# Patient Record
Sex: MALE | Race: OTHER | URBAN - METROPOLITAN AREA
[De-identification: names, ages, dates, MRNs, and addresses within clinical notes are randomized per-mention and may not be internally consistent; named-entity substitution may affect disease eponyms.]

---

## 2021-03-13 DIAGNOSIS — Z23 NEED FOR VACCINATION: ICD-10-CM

## 2021-12-28 ENCOUNTER — TELEPHONE (OUTPATIENT)
Dept: INTERNAL MEDICINE CLINIC | Facility: CLINIC | Age: 81
End: 2021-12-28

## 2023-07-24 RX ORDER — DIPHENOXYLATE HYDROCHLORIDE AND ATROPINE SULFATE 2.5; .025 MG/1; MG/1
1 TABLET ORAL
COMMUNITY

## 2023-07-24 RX ORDER — ACYCLOVIR 400 MG/1
400 TABLET ORAL DAILY
COMMUNITY

## 2023-07-24 RX ORDER — ATORVASTATIN CALCIUM 10 MG/1
10 TABLET, FILM COATED ORAL NIGHTLY
COMMUNITY

## 2023-07-24 RX ORDER — LEVOTHYROXINE SODIUM 0.03 MG/1
25 TABLET ORAL
COMMUNITY

## 2023-07-24 RX ORDER — ERGOCALCIFEROL 1.25 MG/1
50000 CAPSULE ORAL WEEKLY
COMMUNITY

## 2023-07-24 RX ORDER — MELATONIN
325 DAILY
COMMUNITY

## 2023-07-31 ENCOUNTER — HOSPITAL ENCOUNTER (OUTPATIENT)
Facility: HOSPITAL | Age: 83
Discharge: HOME | DRG: 726 | End: 2023-08-02
Attending: UROLOGY | Admitting: UROLOGY
Payer: MEDICARE

## 2023-07-31 ENCOUNTER — ANESTHESIA (OUTPATIENT)
Dept: SURGERY | Facility: HOSPITAL | Age: 83
DRG: 726 | End: 2023-07-31
Payer: MEDICARE

## 2023-07-31 ENCOUNTER — HOSPITAL ENCOUNTER (INPATIENT)
Facility: HOSPITAL | Age: 83
LOS: 1 days | Discharge: HOME | DRG: 726 | End: 2023-08-02
Attending: UROLOGY | Admitting: UROLOGY
Payer: MEDICARE

## 2023-07-31 ENCOUNTER — ANESTHESIA EVENT (OUTPATIENT)
Dept: SURGERY | Facility: HOSPITAL | Age: 83
DRG: 726 | End: 2023-07-31
Payer: MEDICARE

## 2023-07-31 PROBLEM — N40.1 BPH LOC W URIN OBS/LUTS: Status: ACTIVE | Noted: 2023-07-31

## 2023-07-31 PROBLEM — E03.9 HYPOTHYROIDISM: Status: ACTIVE | Noted: 2023-07-31

## 2023-07-31 PROCEDURE — 99212 OFFICE O/P EST SF 10 MIN: CPT | Performed by: HOSPITALIST

## 2023-07-31 PROCEDURE — 0V508ZZ DESTRUCTION OF PROSTATE, VIA NATURAL OR ARTIFICIAL OPENING ENDOSCOPIC: ICD-10-PCS | Performed by: UROLOGY

## 2023-07-31 RX ORDER — VECURONIUM BROMIDE 1 MG/ML
INJECTION, POWDER, LYOPHILIZED, FOR SOLUTION INTRAVENOUS AS NEEDED
Status: DISCONTINUED | OUTPATIENT
Start: 2023-07-31 | End: 2023-07-31 | Stop reason: SURG

## 2023-07-31 RX ORDER — CEFAZOLIN SODIUM/WATER 2 G/20 ML
2 SYRINGE (ML) INTRAVENOUS EVERY 8 HOURS
Status: COMPLETED | OUTPATIENT
Start: 2023-07-31 | End: 2023-08-01

## 2023-07-31 RX ORDER — ONDANSETRON 4 MG/1
4 TABLET, ORALLY DISINTEGRATING ORAL EVERY 6 HOURS PRN
Status: DISCONTINUED | OUTPATIENT
Start: 2023-07-31 | End: 2023-08-02

## 2023-07-31 RX ORDER — FINASTERIDE 5 MG/1
5 TABLET, FILM COATED ORAL DAILY
Status: DISCONTINUED | OUTPATIENT
Start: 2023-08-01 | End: 2023-08-02

## 2023-07-31 RX ORDER — HYDROMORPHONE HYDROCHLORIDE 1 MG/ML
0.2 INJECTION, SOLUTION INTRAMUSCULAR; INTRAVENOUS; SUBCUTANEOUS EVERY 5 MIN PRN
Status: DISCONTINUED | OUTPATIENT
Start: 2023-07-31 | End: 2023-07-31 | Stop reason: HOSPADM

## 2023-07-31 RX ORDER — SENNOSIDES 8.6 MG
17.2 TABLET ORAL NIGHTLY PRN
Status: DISCONTINUED | OUTPATIENT
Start: 2023-07-31 | End: 2023-08-02

## 2023-07-31 RX ORDER — HYDROMORPHONE HYDROCHLORIDE 1 MG/ML
0.4 INJECTION, SOLUTION INTRAMUSCULAR; INTRAVENOUS; SUBCUTANEOUS EVERY 5 MIN PRN
Status: DISCONTINUED | OUTPATIENT
Start: 2023-07-31 | End: 2023-07-31 | Stop reason: HOSPADM

## 2023-07-31 RX ORDER — BISACODYL 10 MG
10 SUPPOSITORY, RECTAL RECTAL
Status: DISCONTINUED | OUTPATIENT
Start: 2023-07-31 | End: 2023-08-02

## 2023-07-31 RX ORDER — ENEMA 19; 7 G/133ML; G/133ML
1 ENEMA RECTAL ONCE AS NEEDED
Status: DISCONTINUED | OUTPATIENT
Start: 2023-07-31 | End: 2023-08-02

## 2023-07-31 RX ORDER — HYDROMORPHONE HYDROCHLORIDE 1 MG/ML
0.4 INJECTION, SOLUTION INTRAMUSCULAR; INTRAVENOUS; SUBCUTANEOUS EVERY 2 HOUR PRN
Status: DISCONTINUED | OUTPATIENT
Start: 2023-07-31 | End: 2023-08-02

## 2023-07-31 RX ORDER — MORPHINE SULFATE 4 MG/ML
4 INJECTION, SOLUTION INTRAMUSCULAR; INTRAVENOUS EVERY 10 MIN PRN
Status: DISCONTINUED | OUTPATIENT
Start: 2023-07-31 | End: 2023-07-31 | Stop reason: HOSPADM

## 2023-07-31 RX ORDER — MAGNESIUM HYDROXIDE 1200 MG/15ML
3000 LIQUID ORAL CONTINUOUS
Status: DISCONTINUED | OUTPATIENT
Start: 2023-07-31 | End: 2023-08-02

## 2023-07-31 RX ORDER — ACETAMINOPHEN 325 MG/1
650 TABLET ORAL
Status: DISCONTINUED | OUTPATIENT
Start: 2023-07-31 | End: 2023-08-02

## 2023-07-31 RX ORDER — NALOXONE HYDROCHLORIDE 0.4 MG/ML
80 INJECTION, SOLUTION INTRAMUSCULAR; INTRAVENOUS; SUBCUTANEOUS AS NEEDED
Status: DISCONTINUED | OUTPATIENT
Start: 2023-07-31 | End: 2023-07-31 | Stop reason: HOSPADM

## 2023-07-31 RX ORDER — SODIUM CHLORIDE, SODIUM LACTATE, POTASSIUM CHLORIDE, CALCIUM CHLORIDE 600; 310; 30; 20 MG/100ML; MG/100ML; MG/100ML; MG/100ML
INJECTION, SOLUTION INTRAVENOUS CONTINUOUS
Status: DISCONTINUED | OUTPATIENT
Start: 2023-07-31 | End: 2023-07-31 | Stop reason: HOSPADM

## 2023-07-31 RX ORDER — OXYCODONE HYDROCHLORIDE 5 MG/1
5 TABLET ORAL EVERY 4 HOURS PRN
Status: DISCONTINUED | OUTPATIENT
Start: 2023-07-31 | End: 2023-08-02

## 2023-07-31 RX ORDER — OXYCODONE HYDROCHLORIDE 5 MG/1
2.5 TABLET ORAL EVERY 4 HOURS PRN
Status: DISCONTINUED | OUTPATIENT
Start: 2023-07-31 | End: 2023-08-02

## 2023-07-31 RX ORDER — HYDROMORPHONE HYDROCHLORIDE 1 MG/ML
0.2 INJECTION, SOLUTION INTRAMUSCULAR; INTRAVENOUS; SUBCUTANEOUS EVERY 2 HOUR PRN
Status: DISCONTINUED | OUTPATIENT
Start: 2023-07-31 | End: 2023-08-02

## 2023-07-31 RX ORDER — ACETAMINOPHEN 500 MG
1000 TABLET ORAL ONCE
Status: COMPLETED | OUTPATIENT
Start: 2023-07-31 | End: 2023-07-31

## 2023-07-31 RX ORDER — LEVOTHYROXINE SODIUM 0.03 MG/1
25 TABLET ORAL
Status: DISCONTINUED | OUTPATIENT
Start: 2023-08-01 | End: 2023-08-02

## 2023-07-31 RX ORDER — MORPHINE SULFATE 10 MG/ML
6 INJECTION, SOLUTION INTRAMUSCULAR; INTRAVENOUS EVERY 10 MIN PRN
Status: DISCONTINUED | OUTPATIENT
Start: 2023-07-31 | End: 2023-07-31 | Stop reason: HOSPADM

## 2023-07-31 RX ORDER — SODIUM CHLORIDE, SODIUM LACTATE, POTASSIUM CHLORIDE, CALCIUM CHLORIDE 600; 310; 30; 20 MG/100ML; MG/100ML; MG/100ML; MG/100ML
INJECTION, SOLUTION INTRAVENOUS CONTINUOUS
Status: DISCONTINUED | OUTPATIENT
Start: 2023-07-31 | End: 2023-07-31

## 2023-07-31 RX ORDER — ATORVASTATIN CALCIUM 10 MG/1
10 TABLET, FILM COATED ORAL NIGHTLY
Status: DISCONTINUED | OUTPATIENT
Start: 2023-07-31 | End: 2023-08-02

## 2023-07-31 RX ORDER — HYDROMORPHONE HYDROCHLORIDE 1 MG/ML
0.6 INJECTION, SOLUTION INTRAMUSCULAR; INTRAVENOUS; SUBCUTANEOUS EVERY 5 MIN PRN
Status: DISCONTINUED | OUTPATIENT
Start: 2023-07-31 | End: 2023-07-31 | Stop reason: HOSPADM

## 2023-07-31 RX ORDER — POLYETHYLENE GLYCOL 3350 17 G/17G
17 POWDER, FOR SOLUTION ORAL DAILY PRN
Status: DISCONTINUED | OUTPATIENT
Start: 2023-07-31 | End: 2023-08-02

## 2023-07-31 RX ORDER — CEFAZOLIN SODIUM/WATER 2 G/20 ML
2 SYRINGE (ML) INTRAVENOUS ONCE
Status: COMPLETED | OUTPATIENT
Start: 2023-07-31 | End: 2023-07-31

## 2023-07-31 RX ORDER — TAMSULOSIN HYDROCHLORIDE 0.4 MG/1
0.4 CAPSULE ORAL
Status: DISCONTINUED | OUTPATIENT
Start: 2023-08-01 | End: 2023-08-02

## 2023-07-31 RX ORDER — LIDOCAINE HYDROCHLORIDE 10 MG/ML
INJECTION, SOLUTION EPIDURAL; INFILTRATION; INTRACAUDAL; PERINEURAL AS NEEDED
Status: DISCONTINUED | OUTPATIENT
Start: 2023-07-31 | End: 2023-07-31 | Stop reason: SURG

## 2023-07-31 RX ORDER — MORPHINE SULFATE 4 MG/ML
2 INJECTION, SOLUTION INTRAMUSCULAR; INTRAVENOUS EVERY 10 MIN PRN
Status: DISCONTINUED | OUTPATIENT
Start: 2023-07-31 | End: 2023-07-31 | Stop reason: HOSPADM

## 2023-07-31 RX ORDER — PANTOPRAZOLE SODIUM 40 MG/1
40 TABLET, DELAYED RELEASE ORAL
Status: DISCONTINUED | OUTPATIENT
Start: 2023-08-01 | End: 2023-08-02

## 2023-07-31 RX ORDER — ONDANSETRON 2 MG/ML
4 INJECTION INTRAMUSCULAR; INTRAVENOUS EVERY 6 HOURS PRN
Status: DISCONTINUED | OUTPATIENT
Start: 2023-07-31 | End: 2023-08-02

## 2023-07-31 RX ORDER — SODIUM CHLORIDE 9 MG/ML
INJECTION, SOLUTION INTRAVENOUS CONTINUOUS
Status: DISCONTINUED | OUTPATIENT
Start: 2023-07-31 | End: 2023-08-02

## 2023-07-31 RX ADMIN — VECURONIUM BROMIDE 1 MG: 1 INJECTION, POWDER, LYOPHILIZED, FOR SOLUTION INTRAVENOUS at 10:05:00

## 2023-07-31 RX ADMIN — VECURONIUM BROMIDE 4 MG: 1 INJECTION, POWDER, LYOPHILIZED, FOR SOLUTION INTRAVENOUS at 09:55:00

## 2023-07-31 RX ADMIN — VECURONIUM BROMIDE 1 MG: 1 INJECTION, POWDER, LYOPHILIZED, FOR SOLUTION INTRAVENOUS at 09:50:00

## 2023-07-31 RX ADMIN — CEFAZOLIN SODIUM/WATER 2 G: 2 G/20 ML SYRINGE (ML) INTRAVENOUS at 09:35:00

## 2023-07-31 RX ADMIN — LIDOCAINE HYDROCHLORIDE 50 MG: 10 INJECTION, SOLUTION EPIDURAL; INFILTRATION; INTRACAUDAL; PERINEURAL at 09:48:00

## 2023-07-31 RX ADMIN — VECURONIUM BROMIDE 1 MG: 1 INJECTION, POWDER, LYOPHILIZED, FOR SOLUTION INTRAVENOUS at 10:55:00

## 2023-07-31 NOTE — BRIEF OP NOTE
Pre-Operative Diagnosis: Enlarged prostate, urinary retention     Post-Operative Diagnosis: Enlarged prostate      Procedure Performed:   Cystoscopy, bipolar transurethral resection of prostate    Surgeon(s) and Role:     Jennifer Smith MD - Primary    Assistant(s):        Surgical Findings: Trilobar obstruction, enlarged ball valving median lobe. Bilateral lateral wall diverticuli. Right ureteral orifice seen within right tic, left UO not seen.      Specimen: Prostate chips     Estimated Blood Loss: 25 mL      Jaymie Salas MD  7/31/2023  11:57 AM

## 2023-07-31 NOTE — ANESTHESIA PROCEDURE NOTES
Airway  Date/Time: 7/31/2023 9:57 AM  Urgency: elective    Airway not difficult    General Information and Staff    Patient location during procedure: OR  Anesthesiologist: Migel Keenan MD  Performed: anesthesiologist   Performed by: Migel Keenan MD  Authorized by: Migel Keenan MD      Indications and Patient Condition  Indications for airway management: anesthesia  Spontaneous Ventilation: absent  Sedation level: deep  Preoxygenated: yes  Patient position: sniffing  Mask difficulty assessment: 2 - vent by mask + OA or adjuvant +/- NMBA    Final Airway Details  Final airway type: endotracheal airway      Successful airway: ETT     Successful intubation technique: Video laryngoscopy  Facilitating devices/methods: intubating stylet  Endotracheal tube insertion site: oral  Blade size: #4  ETT size (mm): 7.5    Cormack-Lehane Classification: grade I - full view of glottis  Placement verified by: capnometry and single lung ventilation   Measured from: teeth  ETT to teeth (cm): 21  Number of attempts at approach: 1  Ventilation between attempts: none  Number of other approaches attempted: 0    Additional Comments  [place]

## 2023-07-31 NOTE — PLAN OF CARE
Problem: Patient Centered Care  Goal: Patient preferences are identified and integrated in the patient's plan of care  Description: Interventions:  - What would you like us to know as we care for you?   - Provide timely, complete, and accurate information to patient/family  - Incorporate patient and family knowledge, values, beliefs, and cultural backgrounds into the planning and delivery of care  - Encourage patient/family to participate in care and decision-making at the level they choose  - Honor patient and family perspectives and choices  Outcome: Progressing     Problem: Patient/Family Goals  Goal: Patient/Family Long Term Goal  Description: Patient's Long Term Goal:     Interventions:  -   - See additional Care Plan goals for specific interventions  Outcome: Progressing  Goal: Patient/Family Short Term Goal  Description: Patient's Short Term Goal:     Interventions:   -  - See additional Care Plan goals for specific interventions  Outcome: Progressing     Problem: PAIN - ADULT  Goal: Verbalizes/displays adequate comfort level or patient's stated pain goal  Description: INTERVENTIONS:  - Encourage pt to monitor pain and request assistance  - Assess pain using appropriate pain scale  - Administer analgesics based on type and severity of pain and evaluate response  - Implement non-pharmacological measures as appropriate and evaluate response  - Consider cultural and social influences on pain and pain management  - Manage/alleviate anxiety  - Utilize distraction and/or relaxation techniques  - Monitor for opioid side effects  - Notify MD/LIP if interventions unsuccessful or patient reports new pain  - Anticipate increased pain with activity and pre-medicate as appropriate  Outcome: Progressing     Problem: RISK FOR INFECTION - ADULT  Goal: Absence of fever/infection during anticipated neutropenic period  Description: INTERVENTIONS  - Monitor WBC  - Administer growth factors as ordered  - Implement neutropenic guidelines  Outcome: Progressing     Problem: SAFETY ADULT - FALL  Goal: Free from fall injury  Description: INTERVENTIONS:  - Assess pt frequently for physical needs  - Identify cognitive and physical deficits and behaviors that affect risk of falls. - [place] fall precautions as indicated by assessment.  - Educate pt/family on patient safety including physical limitations  - Instruct pt to call for assistance with activity based on assessment  - Modify environment to reduce risk of injury  - Provide assistive devices as appropriate  - Consider OT/PT consult to assist with strengthening/mobility  - Encourage toileting schedule  Outcome: Progressing     Primarily Yoruba-speaking, family available at bedside to translate. Alert and oriented x4. Reports mild surgical pain. Pain managed with scheduled tylenol. CBI running @ moderate rate, + pink/clear output. Tolerating well. Up standby assist with rolling walker. IV fluids.  Plans to discharge home pending medical clearance

## 2023-08-01 PROBLEM — Z90.79 S/P PROSTATECTOMY: Status: ACTIVE | Noted: 2023-08-01

## 2023-08-01 LAB
ANION GAP SERPL CALC-SCNC: 6 MMOL/L (ref 0–18)
BUN BLD-MCNC: 16 MG/DL (ref 7–18)
BUN/CREAT SERPL: 15.4 (ref 10–20)
CALCIUM BLD-MCNC: 8.1 MG/DL (ref 8.5–10.1)
CHLORIDE SERPL-SCNC: 110 MMOL/L (ref 98–112)
CO2 SERPL-SCNC: 25 MMOL/L (ref 21–32)
CREAT BLD-MCNC: 1.04 MG/DL
DEPRECATED RDW RBC AUTO: 43.5 FL (ref 35.1–46.3)
EGFRCR SERPLBLD CKD-EPI 2021: 72 ML/MIN/1.73M2 (ref 60–?)
ERYTHROCYTE [DISTWIDTH] IN BLOOD BY AUTOMATED COUNT: 13.1 % (ref 11–15)
GLUCOSE BLD-MCNC: 91 MG/DL (ref 70–99)
HCT VFR BLD AUTO: 35.8 %
HGB BLD-MCNC: 12.2 G/DL
MCH RBC QN AUTO: 31.2 PG (ref 26–34)
MCHC RBC AUTO-ENTMCNC: 34.1 G/DL (ref 31–37)
MCV RBC AUTO: 91.6 FL
OSMOLALITY SERPL CALC.SUM OF ELEC: 293 MOSM/KG (ref 275–295)
PLATELET # BLD AUTO: 146 10(3)UL (ref 150–450)
POTASSIUM SERPL-SCNC: 4.2 MMOL/L (ref 3.5–5.1)
RBC # BLD AUTO: 3.91 X10(6)UL
SODIUM SERPL-SCNC: 141 MMOL/L (ref 136–145)
WBC # BLD AUTO: 9.5 X10(3) UL (ref 4–11)

## 2023-08-01 PROCEDURE — 99233 SBSQ HOSP IP/OBS HIGH 50: CPT | Performed by: HOSPITALIST

## 2023-08-01 NOTE — PLAN OF CARE
Pt alert and oriented primarily speaks Irish and is on RA. Pt's pain is controled with Tylenol. Pt denies nausea or vomiting. CBI is in place with pink tinged output on Moderate flow. IVF and IV abx are running as ordered. Pt is up with standby assist and walker. Pt is tolerating general diet well. Call light is within reach and safety measures are in place. Problem: PAIN - ADULT  Goal: Verbalizes/displays adequate comfort level or patient's stated pain goal  Description: INTERVENTIONS:  - Encourage pt to monitor pain and request assistance  - Assess pain using appropriate pain scale  - Administer analgesics based on type and severity of pain and evaluate response  - Implement non-pharmacological measures as appropriate and evaluate response  - Consider cultural and social influences on pain and pain management  - Manage/alleviate anxiety  - Utilize distraction and/or relaxation techniques  - Monitor for opioid side effects  - Notify MD/LIP if interventions unsuccessful or patient reports new pain  - Anticipate increased pain with activity and pre-medicate as appropriate  Outcome: Progressing     Problem: RISK FOR INFECTION - ADULT  Goal: Absence of fever/infection during anticipated neutropenic period  Description: INTERVENTIONS  - Monitor WBC  - Administer growth factors as ordered  - Implement neutropenic guidelines  Outcome: Progressing     Problem: SAFETY ADULT - FALL  Goal: Free from fall injury  Description: INTERVENTIONS:  - Assess pt frequently for physical needs  - Identify cognitive and physical deficits and behaviors that affect risk of falls.   - [place] fall precautions as indicated by assessment.  - Educate pt/family on patient safety including physical limitations  - Instruct pt to call for assistance with activity based on assessment  - Modify environment to reduce risk of injury  - Provide assistive devices as appropriate  - Consider OT/PT consult to assist with strengthening/mobility  - Encourage toileting schedule  Outcome: Progressing

## 2023-08-02 VITALS
HEART RATE: 89 BPM | DIASTOLIC BLOOD PRESSURE: 61 MMHG | HEIGHT: 69 IN | TEMPERATURE: 98 F | SYSTOLIC BLOOD PRESSURE: 111 MMHG | RESPIRATION RATE: 19 BRPM | WEIGHT: 170.38 LBS | BODY MASS INDEX: 25.23 KG/M2 | OXYGEN SATURATION: 98 %

## 2023-08-02 PROCEDURE — 99239 HOSP IP/OBS DSCHRG MGMT >30: CPT | Performed by: HOSPITALIST

## 2023-08-02 RX ORDER — DUTASTERIDE 0.5 MG/1
0.5 CAPSULE, LIQUID FILLED ORAL DAILY
Qty: 30 CAPSULE | Refills: 0 | Status: SHIPPED | OUTPATIENT
Start: 2023-08-02 | End: 2023-08-02

## 2023-08-02 RX ORDER — OXYCODONE HYDROCHLORIDE 5 MG/1
2.5 TABLET ORAL EVERY 6 HOURS PRN
Qty: 6 TABLET | Refills: 0 | Status: SHIPPED | OUTPATIENT
Start: 2023-08-02

## 2023-08-02 RX ORDER — ACETAMINOPHEN 325 MG/1
650 TABLET ORAL EVERY 6 HOURS PRN
Qty: 1 TABLET | Refills: 0 | Status: SHIPPED | COMMUNITY
Start: 2023-08-02

## 2023-08-02 RX ORDER — FINASTERIDE 5 MG/1
5 TABLET, FILM COATED ORAL DAILY
Qty: 1 TABLET | Refills: 0 | Status: SHIPPED | COMMUNITY
Start: 2023-08-03

## 2023-08-02 RX ORDER — NALOXONE HYDROCHLORIDE 4 MG/.1ML
4 SPRAY NASAL AS NEEDED
Qty: 1 KIT | Refills: 0 | Status: SHIPPED | OUTPATIENT
Start: 2023-08-02

## 2023-08-02 NOTE — PLAN OF CARE
Problem: Patient Centered Care  Goal: Patient preferences are identified and integrated in the patient's plan of care  Description: Interventions:  - What would you like us to know as we care for you?   - Provide timely, complete, and accurate information to patient/family  - Incorporate patient and family knowledge, values, beliefs, and cultural backgrounds into the planning and delivery of care  - Encourage patient/family to participate in care and decision-making at the level they choose  - Honor patient and family perspectives and choices  Outcome: Progressing     Problem: Patient/Family Goals  Goal: Patient/Family Long Term Goal  Description: Patient's Long Term Goal:     Interventions:  -   - See additional Care Plan goals for specific interventions  Outcome: Progressing  Goal: Patient/Family Short Term Goal  Description: Patient's Short Term Goal:     Interventions:   -   - See additional Care Plan goals for specific interventions  Outcome: Progressing     Problem: PAIN - ADULT  Goal: Verbalizes/displays adequate comfort level or patient's stated pain goal  Description: INTERVENTIONS:  - Encourage pt to monitor pain and request assistance  - Assess pain using appropriate pain scale  - Administer analgesics based on type and severity of pain and evaluate response  - Implement non-pharmacological measures as appropriate and evaluate response  - Consider cultural and social influences on pain and pain management  - Manage/alleviate anxiety  - Utilize distraction and/or relaxation techniques  - Monitor for opioid side effects  - Notify MD/LIP if interventions unsuccessful or patient reports new pain  - Anticipate increased pain with activity and pre-medicate as appropriate  Outcome: Progressing     Problem: RISK FOR INFECTION - ADULT  Goal: Absence of fever/infection during anticipated neutropenic period  Description: INTERVENTIONS  - Monitor WBC  - Administer growth factors as ordered  - Implement neutropenic guidelines  Outcome: Progressing     Problem: SAFETY ADULT - FALL  Goal: Free from fall injury  Description: INTERVENTIONS:  - Assess pt frequently for physical needs  - Identify cognitive and physical deficits and behaviors that affect risk of falls. - [place] fall precautions as indicated by assessment.  - Educate pt/family on patient safety including physical limitations  - Instruct pt to call for assistance with activity based on assessment  - Modify environment to reduce risk of injury  - Provide assistive devices as appropriate  - Consider OT/PT consult to assist with strengthening/mobility  - Encourage toileting schedule  Outcome: Progressing   A/Ox4. Vital signs stable on room air. Tolerating diet. CBI running at a moderate/slow rate, clamped at 6am. Scheduled tylenol for pain management. Up with stand by assist. Fall precautions in place, call light within reach, frequent rounding done.

## 2023-08-02 NOTE — PLAN OF CARE
Pt denies pain/discomfort. Tolerating diet. Darling removed by MD, voiding trial. Pt able to void, PVRs recorded and MD notified. Pt cleared to discharge home today. Reviewed discharge informations, medications, and follow up with patient and wife via [employer] . VSS. DC'ed home in stable condition.        Problem: Patient Centered Care  Goal: Patient preferences are identified and integrated in the patient's plan of care  Description: Interventions:  - What would you like us to know as we care for you?   - Provide timely, complete, and accurate information to patient/family  - Incorporate patient and family knowledge, values, beliefs, and cultural backgrounds into the planning and delivery of care  - Encourage patient/family to participate in care and decision-making at the level they choose  - Honor patient and family perspectives and choices  Outcome: Adequate for Discharge       Problem: PAIN - ADULT  Goal: Verbalizes/displays adequate comfort level or patient's stated pain goal  Description: INTERVENTIONS:  - Encourage pt to monitor pain and request assistance  - Assess pain using appropriate pain scale  - Administer analgesics based on type and severity of pain and evaluate response  - Implement non-pharmacological measures as appropriate and evaluate response  - Consider cultural and social influences on pain and pain management  - Manage/alleviate anxiety  - Utilize distraction and/or relaxation techniques  - Monitor for opioid side effects  - Notify MD/LIP if interventions unsuccessful or patient reports new pain  - Anticipate increased pain with activity and pre-medicate as appropriate  Outcome: Adequate for Discharge     Problem: RISK FOR INFECTION - ADULT  Goal: Absence of fever/infection during anticipated neutropenic period  Description: INTERVENTIONS  - Monitor WBC  - Administer growth factors as ordered  - Implement neutropenic guidelines  Outcome: Adequate for Discharge     Problem: SAFETY ADULT - FALL  Goal: Free from fall injury  Description: INTERVENTIONS:  - Assess pt frequently for physical needs  - Identify cognitive and physical deficits and behaviors that affect risk of falls.   - [place] fall precautions as indicated by assessment.  - Educate pt/family on patient safety including physical limitations  - Instruct pt to call for assistance with activity based on assessment  - Modify environment to reduce risk of injury  - Provide assistive devices as appropriate  - Consider OT/PT consult to assist with strengthening/mobility  - Encourage toileting schedule  Outcome: Adequate for Discharge

## 2023-08-02 NOTE — PLAN OF CARE
Appetite good, scheduled tylenol for pain control, CBI pink/clear no clots noted, plan to clamp at 6am, up ambulating in hallway,   Patient and wife updated on care plan.     Problem: Patient Centered Care  Goal: Patient preferences are identified and integrated in the patient's plan of care  Description: Interventions:  - What would you like us to know as we care for you? -  - Provide timely, complete, and accurate information to patient/family  - Incorporate patient and family knowledge, values, beliefs, and cultural backgrounds into the planning and delivery of care  - Encourage patient/family to participate in care and decision-making at the level they choose  - Honor patient and family perspectives and choices  Outcome: Progressing     Problem: Patient/Family Goals  Goal: Patient/Family Long Term Goal  Description: Patient's Long Term Goal: -    Interventions:  - -  - See additional Care Plan goals for specific interventions  Outcome: Progressing  Goal: Patient/Family Short Term Goal  Description: Patient's Short Term Goal: -    Interventions:   - -  - See additional Care Plan goals for specific interventions  Outcome: Progressing     Problem: PAIN - ADULT  Goal: Verbalizes/displays adequate comfort level or patient's stated pain goal  Description: INTERVENTIONS:  - Encourage pt to monitor pain and request assistance  - Assess pain using appropriate pain scale  - Administer analgesics based on type and severity of pain and evaluate response  - Implement non-pharmacological measures as appropriate and evaluate response  - Consider cultural and social influences on pain and pain management  - Manage/alleviate anxiety  - Utilize distraction and/or relaxation techniques  - Monitor for opioid side effects  - Notify MD/LIP if interventions unsuccessful or patient reports new pain  - Anticipate increased pain with activity and pre-medicate as appropriate  Outcome: Progressing     Problem: RISK FOR INFECTION - ADULT  Goal: Absence of fever/infection during anticipated neutropenic period  Description: INTERVENTIONS  - Monitor WBC  - Administer growth factors as ordered  - Implement neutropenic guidelines  Outcome: Progressing     Problem: SAFETY ADULT - FALL  Goal: Free from fall injury  Description: INTERVENTIONS:  - Assess pt frequently for physical needs  - Identify cognitive and physical deficits and behaviors that affect risk of falls.   - [place] fall precautions as indicated by assessment.  - Educate pt/family on patient safety including physical limitations  - Instruct pt to call for assistance with activity based on assessment  - Modify environment to reduce risk of injury  - Provide assistive devices as appropriate  - Consider OT/PT consult to assist with strengthening/mobility  - Encourage toileting schedule  Outcome: Progressing

## 2023-08-02 NOTE — DISCHARGE SUMMARY
Dc summary#[id number]  > 30 min spent on [facility] Discharge Diagnoses: s/p turp hematuria    Lace+ Score: 31  59-90 High Risk  29-58 Medium Risk  0-28   Low Risk. TCM Follow-Up Recommendation:  LACE < 29: Low Risk of readmission after discharge from the hospital; Still recommend for TCM follow-up.

## 2023-08-03 NOTE — DISCHARGE SUMMARY
Milo Chaparro    PATIENT'S NAME: Missy Robbins   ATTENDING PHYSICIAN: Vickie Escobar MD   PATIENT ACCOUNT#:   [id number]    LOCATION:  [address] [place] [address] [address] RECORD #:   [id number]       YOB: 1940  ADMISSION DATE:       07/31/2023      DISCHARGE DATE:  08/02/2023    DISCHARGE SUMMARY  45 min spent on [address] [employer] COURSE:  This is a very pleasant 80-year-old  American male who is mostly Afghan-speaking, who presents with a history of having had urinary retention from an enlarged prostate and underwent a bipolar transurethral resection of the prostate. The patient had a catheter placed and was admitted to the hospital.  He was followed by Dr. Liam Sheppard and her service and improved. He still had some tinge of hematuria and was originally going to go home with a Darling catheter, but the patient was actually able to void after the Darling was removed and he was discharged home without a Darling, which was very surprising. Thankfully, he was able to void. PHYSICAL EXAMINATION ON DISCHARGE:  VITAL SIGNS:  Temperature is 98.2, pulse 89, respiratory rate 19, blood pressure is 111/61, 98%. LUNGS:  Occasional rhonchi. HEART:  Normal S1, S2. No S3.  ABDOMEN:  Soft. EXTREMITIES:  Without edema. NEUROLOGIC:  Alert and oriented, friendly and cooperative. LABORATORY STUDIES:  Please see chart. ASSESSMENT AND PLAN:  1. Status post transurethral resection of the prostate. Patient doing well. We will see how he does without a Darling catheter. 2.   DVT prophylaxis with SCDs because of hematuria. 3.   Hypothyroidism. Continue medications. CONDITION UPON DISCHARGE:  Stable. CODE STATUS:  Not discussed during this hospitalization. ACTIVITY:  No heavy exercise, otherwise as tolerated, to clear by Dr. Liam Sheppard. DIET:  As tolerated. FOLLOWUP:  With Dr. Liam Sheppard as she wishes in about a week or sooner if problems.   Call Dr. Symone Tamez within 3 days for followup advice. Return if fevers, chills, sweats, nausea, vomiting, chest pain, shortness of breath, or other complaints. MEDICATIONS ON DISCHARGE:  1.   Acyclovir 400 mg daily as he was taking at home. 2.   Vitamin C daily as he was taking at home. 3.   Vitamin B12 as he was taking at home. 4.   Hold off on Cataflam until he has had no bleeding and until okay with Dr. Mitchell Acevedo. 5.   Vitamin D 50,000 units weekly. 6.   Ferrous sulfate 325 mg daily. 7.   Thera beta carotene 1 daily. 8.   Lipitor 10 mg nightly. Watch for muscle weakness. 9.   Proscar 5 mg daily. I confirmed with pharmacy; he was actually taking that instead of dutasteride so continue with Proscar. 10.   Synthroid 25 mcg daily. 11.   Protonix 40 mg daily. 12.   Flomax 0.4 mg daily. 13.   Tylenol 650 mg every 6 hours as needed for pain. Watch total daily Tylenol, limit to 3 g.  14.   Oxycodone 2.5 mg every 6 hours as needed. Watch total daily Tylenol, limit to 3 g. 15.   Senna 17.2 mg nightly as needed. 16.   Narcan as needed for discharge. RISK OF RE-ADMISSION:  Low. TCM followup is recommended as the patient is outside our system. Dictated By Jovana Kate.  MD Shawn  d: 08/02/2023 17:47:15  t: 08/02/2023 18:34:50  Job [id number]  [facility]/

## 2023-08-03 NOTE — PAYOR COMM NOTE
--------------  DISCHARGE REVIEW    Payor: [employer] ADV PPO  Subscriber #:  [id number]  Authorization Number: [id number]    Admit date: 8/1/23  Admit time:   3:14 PM  Discharge Date: 8/2/2023  5:19 PM     Admitting Physician: Torrey Núñez MD  Attending Physician:  No att. providers found  Primary Care Physician: Abby Mercado MD          Discharge Summary Notes        Discharge Summary signed by Kelly Trujillo MD at 8/3/2023  9:53 AM        Author: Kelly Trujillo MD Specialty: HOSPITALIST Author Type: Physician    Filed: 8/3/2023  9:53 AM Status: Signed    : Kelly Trujillo MD (Physician)         [address] [facility] [id number]   YOB: 1940 [id number]         [facility]    PATIENT'S NAME: [facility]   ATTENDING PHYSICIAN: Vickie Escobar MD   PATIENT ACCOUNT#:   [id number]    LOCATION:  [address] [place] [address] [address] RECORD #:   [id number]       YOB: 1940  ADMISSION DATE:       07/31/2023      DISCHARGE DATE:  08/02/2023    DISCHARGE SUMMARY  45 min spent on [facility] COURSE:  This is a very pleasant 51-year-old  American male who is mostly Sammarinese-speaking, who presents with a history of having had urinary retention from an enlarged prostate and underwent a bipolar transurethral resection of the prostate. The patient had a catheter placed and was admitted to the hospital.  He was followed by Dr. Jett Greene and her service and improved. He still had some tinge of hematuria and was originally going to go home with a Darling catheter, but the patient was actually able to void after the Darling was removed and he was discharged home without a Darling, which was very surprising. Thankfully, he was able to void. PHYSICAL EXAMINATION ON DISCHARGE:  VITAL SIGNS:  Temperature is 98.2, pulse 89, respiratory rate 19, blood pressure is 111/61, 98%. LUNGS:  Occasional rhonchi.   HEART:  Normal S1, S2.  No S3.  ABDOMEN:  Soft. EXTREMITIES:  Without edema. NEUROLOGIC:  Alert and oriented, friendly and cooperative. LABORATORY STUDIES:  Please see chart. ASSESSMENT AND PLAN:  1. Status post transurethral resection of the prostate. Patient doing well. We will see how he does without a Darling catheter. 2.   DVT prophylaxis with SCDs because of hematuria. 3.   Hypothyroidism. Continue medications. CONDITION UPON DISCHARGE:  Stable. CODE STATUS:  Not discussed during this hospitalization. ACTIVITY:  No heavy exercise, otherwise as tolerated, to clear by Dr. Liam Sheppard. DIET:  As tolerated. FOLLOWUP:  With Dr. Liam Sheppard as she wishes in about a week or sooner if problems. Call Dr. Symone Tamez within 3 days for followup advice. Return if fevers, chills, sweats, nausea, vomiting, chest pain, shortness of breath, or other complaints. MEDICATIONS ON DISCHARGE:  1.   Acyclovir 400 mg daily as he was taking at home. 2.   Vitamin C daily as he was taking at home. 3.   Vitamin B12 as he was taking at home. 4.   Hold off on Cataflam until he has had no bleeding and until okay with Dr. Liam Sheppard. 5.   Vitamin D 50,000 units weekly. 6.   Ferrous sulfate 325 mg daily. 7.   Thera beta carotene 1 daily. 8.   Lipitor 10 mg nightly. Watch for muscle weakness. 9.   Proscar 5 mg daily. I confirmed with pharmacy; he was actually taking that instead of dutasteride so continue with Proscar. 10.   Synthroid 25 mcg daily. 11.   Protonix 40 mg daily. 12.   Flomax 0.4 mg daily. 13.   Tylenol 650 mg every 6 hours as needed for pain. Watch total daily Tylenol, limit to 3 g.  14.   Oxycodone 2.5 mg every 6 hours as needed. Watch total daily Tylenol, limit to 3 g. 15.   Senna 17.2 mg nightly as needed. 16.   Narcan as needed for discharge. RISK OF RE-ADMISSION:  Low. TCM followup is recommended as the patient is outside our system. Dictated By Susan Beltran.  MD Shawn  d: 08/02/2023 17:47:15  t: 08/02/2023 18:34:50  Job [id number]  [facility]/    Electronically signed by Kelly Trujillo MD on 8/3/2023  9:53 AM         REVIEWER COMMENTS

## 2023-08-28 NOTE — OPERATIVE REPORT
[facility]     PATIENT'S NAME: [facility]   ATTENDING PHYSICIAN: Apolonia Uribe MD   OPERATING PHYSICIAN: Apolonia Uribe MD     MRN: [id number]  : 9/15/1940  DATE OF OPERATION: 2023    OPERATIVE REPORT        PREOPERATIVE DIAGNOSIS:  BPH, urinary retention    POSTOPERATIVE DIAGNOSIS:  Same    PROCEDURE PERFORMED:  Cystoscopy, bipolar transurethral resection of prostate     ANESTHESIA:  General     ESTIMATED BLOOD LOSS:  25 mL     INTRAVENOUS FLUIDS:  See anesthesia record. URINE OUTPUT:  Not measured     COMPLICATIONS:  None     DRAINS:  22 [place] 3-way huber catheter with CBI     SPECIMENS:  Prostate chips     DISPOSITION:  Stable to recovery room. INDICATIONS:  The patient is a 80year old-year-old male with history of BPH and urinary retention. He was counseled on his options for management and has elected to proceed with TURP. We discussed risks including bleeding, infection, damage to nearby structures, persistent urinary retention, prostatic regrowth, stricture/bladder neck contracture formation, retrograde ejaculation, and others. All questions were answered. FINDINGS:  Trilobar prostatic obstruction. Enlarged ball-valving median lobe. Bilateral lateral wall diverticuli. Right ureteral orifice seen within right diverticulum, left UO not seen. PROCEDURE:  Informed consent was obtained. The patient was brought to the operative suite where general anesthesia was administered. IV antibiotics were administered and SCDs were applied. He was placed in the dorsal lithotomy position, prepped, and draped in the usual fashion. After timeout was completed, the bipolar resectoscope was inserted per urethra and advanced into the bladder under direct vision. The bladder was inspected with findings as above. I started by resecting the median lobe from the bladder neck to verumontanum, until the tissue was flush with the trigone.   Right and lateral lobes were then resected as well. There was a nice wide open channel at the conclusion of resection. Hemostasis was achieved with cautery. All prostate chips were irrigated out through the scope and sent for pathology. The bladder was left full and the scope was removed. A 22 [place] 3-way huber was inserted into the bladder with return of light pink fluid. Balloon was inflated. CBI was initiated. The patient was returned to the supine position and awakened. He was taken to the recovery room in stable condition having tolerated the procedure well.

## 2025-03-11 ENCOUNTER — OFFICE VISIT (OUTPATIENT)
Dept: OTOLARYNGOLOGY | Facility: CLINIC | Age: 85
End: 2025-03-11

## 2025-03-11 DIAGNOSIS — H61.891 POLYP OF RIGHT EAR CANAL: Primary | ICD-10-CM

## 2025-03-11 DIAGNOSIS — H60.501 ACUTE OTITIS EXTERNA OF RIGHT EAR, UNSPECIFIED TYPE: ICD-10-CM

## 2025-03-11 DIAGNOSIS — L29.9 ITCHING OF EAR: ICD-10-CM

## 2025-03-11 DIAGNOSIS — H61.21 EXCESSIVE CERUMEN IN EAR CANAL, RIGHT: ICD-10-CM

## 2025-03-11 PROBLEM — D69.6 THROMBOCYTOPENIA: Chronic | Status: ACTIVE | Noted: 2025-03-11

## 2025-03-11 PROCEDURE — 69210 REMOVE IMPACTED EAR WAX UNI: CPT | Performed by: STUDENT IN AN ORGANIZED HEALTH CARE EDUCATION/TRAINING PROGRAM

## 2025-03-11 PROCEDURE — 1159F MED LIST DOCD IN RCRD: CPT | Performed by: STUDENT IN AN ORGANIZED HEALTH CARE EDUCATION/TRAINING PROGRAM

## 2025-03-11 PROCEDURE — 1160F RVW MEDS BY RX/DR IN RCRD: CPT | Performed by: STUDENT IN AN ORGANIZED HEALTH CARE EDUCATION/TRAINING PROGRAM

## 2025-03-11 PROCEDURE — 99203 OFFICE O/P NEW LOW 30 MIN: CPT | Performed by: STUDENT IN AN ORGANIZED HEALTH CARE EDUCATION/TRAINING PROGRAM

## 2025-03-11 RX ORDER — NEOMYCIN SULFATE, POLYMYXIN B SULFATE AND HYDROCORTISONE 10; 3.5; 1 MG/ML; MG/ML; [USP'U]/ML
5 SUSPENSION/ DROPS AURICULAR (OTIC) 2 TIMES DAILY
Qty: 1 EACH | Refills: 0 | Status: SHIPPED | OUTPATIENT
Start: 2025-03-11

## 2025-03-11 RX ORDER — CIPROFLOXACIN AND DEXAMETHASONE 3; 1 MG/ML; MG/ML
SUSPENSION/ DROPS AURICULAR (OTIC)
COMMUNITY
Start: 2025-03-10

## 2025-03-11 RX ORDER — BETAMETHASONE DIPROPIONATE 0.5 MG/G
OINTMENT, AUGMENTED TOPICAL
Qty: 15 G | Refills: 0 | Status: SHIPPED | OUTPATIENT
Start: 2025-03-11

## 2025-03-11 NOTE — PROGRESS NOTES
Nestor Willingham is a 84 year old male.   Chief Complaint   Patient presents with    Ear Problem     Bilateral ear infection X 2 months      HPI:   84-year-old presents with itching in his left ear and also pain and drainage in the right ear.  Has been going on for 2 months.  Has been on Ciprodex otic drops and oral antibiotics    Current Outpatient Medications   Medication Sig Dispense Refill    ciprofloxacin-dexamethasone 0.3-0.1 % Otic Suspension       Betamethasone Dipropionate Aug 0.05 % External Ointment Apply by topical route twice every day to the affected area(s); do not exceed 45 grams per week. 15 g 0    neomycin-polymyxin-hydrocortisone 3.5-20706-9 Otic Suspension Place 5 drops into the right ear in the morning and 5 drops before bedtime. 1 each 0    acetaminophen 325 MG Oral Tab Take 2 tablets (650 mg total) by mouth every 6 (six) hours as needed for Pain. 1 tablet 0    sennosides 17.2 MG Oral Tab Take 1 tablet (17.2 mg total) by mouth nightly as needed (constipation, as needed if no bowel movement that day). 1 tablet 0    oxyCODONE 5 MG Oral Tab Take 0.5 tablets (2.5 mg total) by mouth every 6 (six) hours as needed for Pain. Watch drowsiness no alcohol 6 tablet 0    Naloxone HCl 4 MG/0.1ML Nasal Liquid 4 mg by Nasal route as needed. If patient remains unresponsive, repeat dose in other nostril 2-5 minutes after first dose. 1 kit 0    finasteride 5 MG Oral Tab Take 1 tablet (5 mg total) by mouth daily. 1 tablet 0    acyclovir 400 MG Oral Tab Take 1 tablet (400 mg total) by mouth daily.      atorvastatin 10 MG Oral Tab Take 1 tablet (10 mg total) by mouth nightly.      levothyroxine 25 MCG Oral Tab Take 1 tablet (25 mcg total) by mouth before breakfast.      ferrous sulfate 325 (65 FE) MG Oral Tab EC Take 1 tablet (325 mg total) by mouth daily.      ergocalciferol 1.25 MG (07091 UT) Oral Cap Take 50,000 Int'l Units by mouth once a week.      Multiple Vitamin (THERA/BETA-CAROTENE) Oral Tab Take 1 tablet  by mouth.      Ascorbic Acid (VITAMIN C ER OR) Take by mouth.      Cyanocobalamin (VITAMIN B-12 CR OR) Take by mouth.      Pantoprazole Sodium (PROTONIX) 40 MG Oral Tab EC Take 1 tablet by mouth every morning before breakfast. 30 tablet 2    tamsulosin HCl (FLOMAX) 0.4 MG Oral Cap Take 1 capsule (0.4 mg total) by mouth.  3      Past Medical History:    BPH (benign prostatic hyperplasia)    Cancer (HCC)    Multmelanoma    Cataract    Disorder of thyroid    Goiter    Bx. done and Nl per pt. 1 yr. ago.     Hearing impairment    Bilateral hearing aids    Hematuria    Sees urologist Dr. barnett at [facility]. Cysto. was NL.     High cholesterol    Other and unspecified hyperlipidemia    Personal history of antineoplastic chemotherapy    Pneumonia due to organism      Social History:  Social History     Socioeconomic History    Marital status:    Tobacco Use    Smoking status: Former     Current packs/day: 0.00     Types: Cigarettes     Quit date: 1974     Years since quittin.3    Smokeless tobacco: Never   Vaping Use    Vaping status: Never Used   Substance and Sexual Activity    Alcohol use: Not Currently     Comment: social    Drug use: Never     Social Drivers of Health     Food Insecurity: No Food Insecurity (3/8/2023)    Received from [facility]    Hunger Vital Sign     Worried About Running Out of Food in the Last Year: Never true     Ran Out of Food in the Last Year: Never true   Transportation Needs: No Transportation Needs (3/8/2023)    Received from [facility]    PRAPARE - Transportation     Lack of Transportation (Medical): No     Lack of Transportation (Non-Medical): No   Housing Stability: Low Risk  (3/8/2023)    Received from [facility]    Housing Stability Vital Sign     Unable to Pay for Housing in the Last Year: No     Number of Places Lived in the Last Year: 1     Unstable Housing in the Last Year: No      Past Surgical History:    Procedure Laterality Date    Cataract Bilateral 2022         EXAM:   There were no vitals taken for this visit.    System Details   Skin Inspection - Normal.   Constitutional Overall appearance - Normal.   Head/Face Symmetric, TMJ tenderness not present    Eyes EOMI, PERRL   Right ear:  Canal with a polyp-like growth that was bleeding easily during the exam also with some edema and erythema near the ear canal entrance.  Cerumen and wet debris as well within the ear canal   Left ear:  Canal dry and irritated but no infection, TM intact, no KATRIN   Nose: Septum midline, inferior turbinates not enlarged, nasal valves without collapse    Oral cavity/Oropharynx: No lesions or masses on inspection or palpation, tonsils symmetric    Neck: Soft without LAD, thyroid not enlarged  Voice clear/ no stridor   Other:      SCOPES AND PROCEDURES:     Canals:  Right: Canal with cerumen preventing adequate view of TM, debrided with instrumentation    Tympanic Membranes:  Right: Normal tympanic membrane.     TM Visualized Method:   Right TM examined via otomicroscopy.      PROCEDURE:   Removal of cerumen impaction   The cerumen impaction was completely removed on the right side using microscopy as necessary.   Removal was completed by using a curette and suction.     AUDIOGRAM AND IMAGING:         IMPRESSION:   1. Polyp of right ear canal    2. Itching of ear    3. Acute otitis externa of right ear, unspecified type    4. Excessive cerumen in ear canal, right       Recommendations:  -Right ear canal with a polyp-like growth that bled easily possible indicative of a chronic otitis externa.  An ear wick was inserted and they will use Ciprodex drops or Cortisporin drops for topical treatment of this polyp and the otitis externa  -Diprolene ointment given from the itching of the left ear canal  -He will return Friday or Monday for the ear wick removal and further investigation of the ear.  Could consider a biopsy or culture of this polyp  or oral therapy if still not improving    The patient indicates understanding of these issues and agrees to the plan.      Leland Bustos MD  3/11/2025  1:08 PM

## 2025-03-17 ENCOUNTER — OFFICE VISIT (OUTPATIENT)
Dept: OTOLARYNGOLOGY | Facility: CLINIC | Age: 85
End: 2025-03-17

## 2025-03-17 VITALS — WEIGHT: 170 LBS | HEIGHT: 69 IN | BODY MASS INDEX: 25.18 KG/M2

## 2025-03-17 DIAGNOSIS — H61.891 POLYP OF RIGHT EAR CANAL: ICD-10-CM

## 2025-03-17 DIAGNOSIS — T16.1XXA FOREIGN BODY OF RIGHT EAR, INITIAL ENCOUNTER: Primary | ICD-10-CM

## 2025-03-17 DIAGNOSIS — H60.501 ACUTE OTITIS EXTERNA OF RIGHT EAR, UNSPECIFIED TYPE: ICD-10-CM

## 2025-03-17 NOTE — PROGRESS NOTES
Nestor Willingham is a 84 year old male.   Chief Complaint   Patient presents with    Follow - Up     polyp of right ear canal     HPI:   84-year-old in follow-up regarding his right otitis externa after ear wick placement.  He he says is feeling better but cannot hear    Current Outpatient Medications   Medication Sig Dispense Refill    ciprofloxacin-dexamethasone 0.3-0.1 % Otic Suspension       Betamethasone Dipropionate Aug 0.05 % External Ointment Apply by topical route twice every day to the affected area(s); do not exceed 45 grams per week. 15 g 0    neomycin-polymyxin-hydrocortisone 3.5-35325-8 Otic Suspension Place 5 drops into the right ear in the morning and 5 drops before bedtime. 1 each 0    acetaminophen 325 MG Oral Tab Take 2 tablets (650 mg total) by mouth every 6 (six) hours as needed for Pain. 1 tablet 0    sennosides 17.2 MG Oral Tab Take 1 tablet (17.2 mg total) by mouth nightly as needed (constipation, as needed if no bowel movement that day). 1 tablet 0    oxyCODONE 5 MG Oral Tab Take 0.5 tablets (2.5 mg total) by mouth every 6 (six) hours as needed for Pain. Watch drowsiness no alcohol 6 tablet 0    Naloxone HCl 4 MG/0.1ML Nasal Liquid 4 mg by Nasal route as needed. If patient remains unresponsive, repeat dose in other nostril 2-5 minutes after first dose. 1 kit 0    finasteride 5 MG Oral Tab Take 1 tablet (5 mg total) by mouth daily. 1 tablet 0    acyclovir 400 MG Oral Tab Take 1 tablet (400 mg total) by mouth daily.      atorvastatin 10 MG Oral Tab Take 1 tablet (10 mg total) by mouth nightly.      levothyroxine 25 MCG Oral Tab Take 1 tablet (25 mcg total) by mouth before breakfast.      ferrous sulfate 325 (65 FE) MG Oral Tab EC Take 1 tablet (325 mg total) by mouth daily.      ergocalciferol 1.25 MG (69165 UT) Oral Cap Take 50,000 Int'l Units by mouth once a week.      Multiple Vitamin (THERA/BETA-CAROTENE) Oral Tab Take 1 tablet by mouth.      Ascorbic Acid (VITAMIN C ER OR) Take by  mouth.      Cyanocobalamin (VITAMIN B-12 CR OR) Take by mouth.      Pantoprazole Sodium (PROTONIX) 40 MG Oral Tab EC Take 1 tablet by mouth every morning before breakfast. 30 tablet 2    tamsulosin HCl (FLOMAX) 0.4 MG Oral Cap Take 1 capsule (0.4 mg total) by mouth.  3      Past Medical History:    BPH (benign prostatic hyperplasia)    Cancer (HCC)    Multmelanoma    Cataract    Disorder of thyroid    Goiter    Bx. done and Nl per pt. 1 yr. ago.     Hearing impairment    Bilateral hearing aids    Hematuria    Sees urologist Dr. barnett at [facility]. Cysto. was NL.     High cholesterol    Other and unspecified hyperlipidemia    Personal history of antineoplastic chemotherapy    Pneumonia due to organism      Social History:  Social History     Socioeconomic History    Marital status:    Tobacco Use    Smoking status: Former     Current packs/day: 0.00     Types: Cigarettes     Quit date: 1974     Years since quittin.3    Smokeless tobacco: Never   Vaping Use    Vaping status: Never Used   Substance and Sexual Activity    Alcohol use: Not Currently     Comment: social    Drug use: Never     Social Drivers of Health     Food Insecurity: No Food Insecurity (3/8/2023)    Received from [facility]    Hunger Vital Sign     Worried About Running Out of Food in the Last Year: Never true     Ran Out of Food in the Last Year: Never true   Transportation Needs: No Transportation Needs (3/8/2023)    Received from [facility]    PRAPARE - Transportation     Lack of Transportation (Medical): No     Lack of Transportation (Non-Medical): No   Housing Stability: Low Risk  (3/8/2023)    Received from [facility]    Housing Stability Vital Sign     Unable to Pay for Housing in the Last Year: No     Number of Places Lived in the Last Year: 1     Unstable Housing in the Last Year: No      Past Surgical History:   Procedure Laterality Date    Cataract Bilateral           EXAM:   Ht 5' 9\" (1.753 m)   Wt 170 lb (77.1 kg)   BMI 25.10 kg/m²     System Details   Skin Inspection - Normal.   Constitutional Overall appearance - Normal.   Head/Face Symmetric, TMJ tenderness not present    Eyes EOMI, PERRL   Right ear:  Canal with a better view today.  The ear wick was removed and there was a hearing aid dome deeper within the ear canal that was removed.  The previously seen polyp is resolving   Left ear:  Canal clear, TM intact, no KATRIN   Nose: Septum midline, inferior turbinates not enlarged, nasal valves without collapse    Oral cavity/Oropharynx: No lesions or masses on inspection or palpation, tonsils symmetric    Neck: Soft without LAD, thyroid not enlarged  Voice clear/ no stridor   Other:      SCOPES AND PROCEDURES:     Procedure.  Removal of foreign body from the right ear canal.  The ear was brought under the binocular microscope.  The hearing aid dome was removed with an alligator forceps    AUDIOGRAM AND IMAGING:         IMPRESSION:   1. Foreign body of right ear, initial encounter    2. Polyp of right ear canal    3. Acute otitis externa of right ear, unspecified type       Recommendations:  -A hearing aid dome was removed this was likely causing a foreign body reaction with the granulation tissue and the polyp previously seen.  Overall it is improving he will continue the otic drops for about 5 days and can start wearing the hearing aid in about 1 to 2 weeks.  I can see him back if needed    The patient indicates understanding of these issues and agrees to the plan.      Leland Bustos MD  3/17/2025  12:59 PM
